# Patient Record
Sex: FEMALE | Race: ASIAN | NOT HISPANIC OR LATINO | Employment: FULL TIME | URBAN - METROPOLITAN AREA
[De-identification: names, ages, dates, MRNs, and addresses within clinical notes are randomized per-mention and may not be internally consistent; named-entity substitution may affect disease eponyms.]

---

## 2021-02-09 ENCOUNTER — AMBULATORY - HEALTHEAST (OUTPATIENT)
Dept: NURSING | Facility: CLINIC | Age: 21
End: 2021-02-09

## 2021-03-02 ENCOUNTER — AMBULATORY - HEALTHEAST (OUTPATIENT)
Dept: NURSING | Facility: CLINIC | Age: 21
End: 2021-03-02

## 2023-02-05 ENCOUNTER — HEALTH MAINTENANCE LETTER (OUTPATIENT)
Age: 23
End: 2023-02-05

## 2023-02-28 ENCOUNTER — OFFICE VISIT (OUTPATIENT)
Dept: INTERNAL MEDICINE | Facility: CLINIC | Age: 23
End: 2023-02-28
Payer: COMMERCIAL

## 2023-02-28 VITALS
OXYGEN SATURATION: 96 % | SYSTOLIC BLOOD PRESSURE: 116 MMHG | RESPIRATION RATE: 16 BRPM | HEIGHT: 62 IN | BODY MASS INDEX: 27.05 KG/M2 | WEIGHT: 147 LBS | HEART RATE: 112 BPM | TEMPERATURE: 97.5 F | DIASTOLIC BLOOD PRESSURE: 76 MMHG

## 2023-02-28 DIAGNOSIS — F33.42 RECURRENT MAJOR DEPRESSIVE DISORDER, IN FULL REMISSION (H): ICD-10-CM

## 2023-02-28 DIAGNOSIS — D22.30 CHANGE IN FACIAL MOLE: Primary | ICD-10-CM

## 2023-02-28 PROCEDURE — 99203 OFFICE O/P NEW LOW 30 MIN: CPT

## 2023-02-28 RX ORDER — ESCITALOPRAM OXALATE 20 MG/1
20 TABLET ORAL DAILY
Qty: 90 TABLET | Refills: 1 | Status: SHIPPED | OUTPATIENT
Start: 2023-02-28 | End: 2024-03-13

## 2023-02-28 RX ORDER — ESCITALOPRAM OXALATE 20 MG/1
1 TABLET ORAL DAILY
COMMUNITY
Start: 2022-02-22 | End: 2023-02-28

## 2023-02-28 NOTE — PROGRESS NOTES
"  Assessment & Plan     (D22.30) Change in facial mole  (primary encounter diagnosis)  Comment:   Pt has mole on face and back she is concerned about. At times they are itchy and she is worried the moles may be growing in size. Moles are about the width of a pencil eraser- about 3 mm in diameter. Will send to dermatology for complete evaluation but we discussed both lesions are likely benign lesions.  Plan: Adult Dermatology Referral          (F33.42) Recurrent major depressive disorder, in full remission (H)  Comment: Symptoms well controlled on current medication. Continue Lexapro 20MG.  Plan: escitalopram (LEXAPRO) 20 MG tablet                 BMI:   Estimated body mass index is 26.67 kg/m  as calculated from the following:    Height as of this encounter: 1.581 m (5' 2.25\").    Weight as of this encounter: 66.7 kg (147 lb).           No follow-ups on file.    Neelam Jolly, DAVION CNP  [facility]            Derrek Desouza is a 22 year old presenting for the following health issues:  Derm Problem (Mole )      History of Present Illness       Reason for visit:  2 moles and establish care  Symptom onset:  3-4 weeks ago  Symptoms include:  Changing mole  Symptom intensity:  Mild  Symptom progression:  Staying the same  Had these symptoms before:  No    She eats 2-3 servings of fruits and vegetables daily.She consumes 1 sweetened beverage(s) daily.She exercises with enough effort to increase her heart rate 9 or less minutes per day.  She exercises with enough effort to increase her heart rate 3 or less days per week. She is missing 2 dose(s) of medications per week.     Establish care   Check moles           Objective    /76   Pulse 112   Temp 97.5  F (36.4  C) (Oral)   Resp 16   Ht 1.581 m (5' 2.25\")   Wt 66.7 kg (147 lb)   LMP  (LMP Unknown)   SpO2 96%   BMI 26.67 kg/m    Body mass index is 26.67 kg/m .  Physical Exam  Constitutional:       General: She is not in acute " distress.     Appearance: Normal appearance. She is not ill-appearing, toxic-appearing or diaphoretic.   HENT:      Head: Normocephalic and atraumatic.   Eyes:      Conjunctiva/sclera: Conjunctivae normal.   Skin:     General: Skin is warm and dry.          Neurological:      Mental Status: She is alert and oriented to person, place, and time.   Psychiatric:         Mood and Affect: Mood normal.         Behavior: Behavior normal.         Thought Content: Thought content normal.         Judgment: Judgment normal.

## 2023-05-01 ENCOUNTER — OFFICE VISIT (OUTPATIENT)
Dept: URGENT CARE | Facility: URGENT CARE | Age: 23
End: 2023-05-01
Payer: COMMERCIAL

## 2023-05-01 VITALS
TEMPERATURE: 100.1 F | OXYGEN SATURATION: 98 % | SYSTOLIC BLOOD PRESSURE: 112 MMHG | RESPIRATION RATE: 16 BRPM | DIASTOLIC BLOOD PRESSURE: 72 MMHG | HEART RATE: 103 BPM

## 2023-05-01 DIAGNOSIS — J02.0 STREPTOCOCCAL SORE THROAT: ICD-10-CM

## 2023-05-01 DIAGNOSIS — R07.0 THROAT PAIN: Primary | ICD-10-CM

## 2023-05-01 DIAGNOSIS — R50.9 FEVER, UNSPECIFIED FEVER CAUSE: ICD-10-CM

## 2023-05-01 LAB — DEPRECATED S PYO AG THROAT QL EIA: POSITIVE

## 2023-05-01 PROCEDURE — 87880 STREP A ASSAY W/OPTIC: CPT | Performed by: PHYSICIAN ASSISTANT

## 2023-05-01 PROCEDURE — 99214 OFFICE O/P EST MOD 30 MIN: CPT | Performed by: PHYSICIAN ASSISTANT

## 2023-05-01 RX ORDER — BUPROPION HYDROCHLORIDE 150 MG/1
1 TABLET ORAL EVERY MORNING
COMMUNITY
Start: 2022-02-22 | End: 2024-01-05

## 2023-05-01 RX ORDER — AMOXICILLIN 875 MG
875 TABLET ORAL 2 TIMES DAILY
Qty: 20 TABLET | Refills: 0 | Status: SHIPPED | OUTPATIENT
Start: 2023-05-01 | End: 2023-05-11

## 2023-05-01 RX ORDER — IBUPROFEN 600 MG/1
600 TABLET, FILM COATED ORAL EVERY 6 HOURS PRN
Qty: 30 TABLET | Refills: 0
Start: 2023-05-01 | End: 2024-01-05

## 2023-05-01 NOTE — PROGRESS NOTES
Assessment & Plan     Throat pain    Motrin for sore throat  Salt water gargles    - Streptococcus A Rapid Screen w/Reflex to PCR  - ibuprofen (ADVIL/MOTRIN) 600 MG tablet; Take 1 tablet (600 mg) by mouth every 6 hours as needed    Streptococcal sore throat    You have had a positive test for strep throat. Strep throat is a contagious illness. It's spread by coughing, kissing, sharing glasses or eating utensils, or by touching others after touching your mouth or nose. Symptoms include throat pain that is worse with swallowing, aching all over, headache, swollen lymph nodes at the front of the neck, and red swollen tonsils sometimes with white patches and fever. It's treated with antibiotic medicine. This should help you start to feel better in 1 to 2 days.     - amoxicillin (AMOXIL) 875 MG tablet; Take 1 tablet (875 mg) by mouth 2 times daily for 10 days    Fever, unspecified fever cause    A fever is a normal reaction of your body to an illness. The temperature itself often isn t harmful. It actually helps your body fight infections. You don t need to treat a fever unless you feel very uncomfortable.   If the fever doesn t get better within 1 hour after you take acetaminophen, take ibuprofen. If this works, keep taking the ibuprofen every 6 to 8 hours.   If either medicine alone doesn t keep the fever down, you may switch off between the 2 medicines every 3 to 4 hours. For example, take ibuprofen. Wait 3 hours. Then take acetaminophen. Wait 3 hours. Take ibuprofen, and so on.    - ibuprofen (ADVIL/MOTRIN) 600 MG tablet; Take 1 tablet (600 mg) by mouth every 6 hours as needed      At today's visit with Lyly Ingram , we discussed results, diagnosis, medications and formulated a plan.  We also discussed red flags for immediate return to clinic/ER, as well as indications for follow up with PCP if not improved in 3 days. Patient understood and agreed to plan. Lyly Howelleder was discharged with stable vitals  and has no further questions.       No follow-ups on file.    Donnell Brower, [facility], PA-C  [facility] JERMAINE Desouza is a 22 year old, presenting for the following health issues:  Pharyngitis (Sore throat, white spots, and chills that started today )         View : No data to display.              HPI   Review of Systems   Constitutional, HEENT, cardiovascular, pulmonary, gi and gu systems are negative, except as otherwise noted.      Objective    /72   Pulse 103   Temp 100.1  F (37.8  C) (Tympanic)   Resp 16   SpO2 98%   There is no height or weight on file to calculate BMI.  Physical Exam   GENERAL: healthy, alert and no distress  EYES: Eyes grossly normal to inspection, PERRL and conjunctivae and sclerae normal  HENT: normal cephalic/atraumatic, ear canals and TM's normal, tonsillar hypertrophy and tonsillar erythema  NECK: cervical adenopathy anterior  RESP: lungs clear to auscultation - no rales, rhonchi or wheezes  CV: regular rate and rhythm, normal S1 S2, no S3 or S4, no murmur, click or rub, no peripheral edema and peripheral pulses strong  MS: no gross musculoskeletal defects noted, no edema  SKIN: no suspicious lesions or rashes  NEURO: Normal strength and tone, mentation intact and speech normal  PSYCH: mentation appears normal, affect normal/bright        Results for orders placed or performed in visit on 05/01/23   Streptococcus A Rapid Screen w/Reflex to PCR     Status: Abnormal    Specimen: Throat; Swab   Result Value Ref Range    Group A Strep antigen Positive (A) Negative

## 2023-07-21 ENCOUNTER — OFFICE VISIT (OUTPATIENT)
Dept: URGENT CARE | Facility: URGENT CARE | Age: 23
End: 2023-07-21
Payer: COMMERCIAL

## 2023-07-21 VITALS
HEART RATE: 109 BPM | BODY MASS INDEX: 28.12 KG/M2 | OXYGEN SATURATION: 96 % | WEIGHT: 155 LBS | RESPIRATION RATE: 18 BRPM | SYSTOLIC BLOOD PRESSURE: 122 MMHG | TEMPERATURE: 98.7 F | DIASTOLIC BLOOD PRESSURE: 83 MMHG

## 2023-07-21 DIAGNOSIS — N76.0 BV (BACTERIAL VAGINOSIS): ICD-10-CM

## 2023-07-21 DIAGNOSIS — B96.89 BV (BACTERIAL VAGINOSIS): ICD-10-CM

## 2023-07-21 DIAGNOSIS — R30.0 DYSURIA: Primary | ICD-10-CM

## 2023-07-21 LAB
ALBUMIN UR-MCNC: NEGATIVE MG/DL
APPEARANCE UR: CLEAR
BACTERIA #/AREA URNS HPF: ABNORMAL /HPF
BILIRUB UR QL STRIP: NEGATIVE
CLUE CELLS: PRESENT
COLOR UR AUTO: YELLOW
GLUCOSE UR STRIP-MCNC: NEGATIVE MG/DL
HGB UR QL STRIP: ABNORMAL
KETONES UR STRIP-MCNC: NEGATIVE MG/DL
LEUKOCYTE ESTERASE UR QL STRIP: ABNORMAL
NITRATE UR QL: NEGATIVE
PH UR STRIP: 5.5 [PH] (ref 5–7)
RBC #/AREA URNS AUTO: ABNORMAL /HPF
SP GR UR STRIP: >=1.03 (ref 1–1.03)
SQUAMOUS #/AREA URNS AUTO: ABNORMAL /LPF
TRICHOMONAS, WET PREP: ABNORMAL
UROBILINOGEN UR STRIP-ACNC: 0.2 E.U./DL
WBC #/AREA URNS AUTO: ABNORMAL /HPF
WBC'S/HIGH POWER FIELD, WET PREP: ABNORMAL
YEAST, WET PREP: ABNORMAL

## 2023-07-21 PROCEDURE — 87210 SMEAR WET MOUNT SALINE/INK: CPT | Performed by: PHYSICIAN ASSISTANT

## 2023-07-21 PROCEDURE — 81001 URINALYSIS AUTO W/SCOPE: CPT | Performed by: PHYSICIAN ASSISTANT

## 2023-07-21 PROCEDURE — 99213 OFFICE O/P EST LOW 20 MIN: CPT | Performed by: PHYSICIAN ASSISTANT

## 2023-07-21 PROCEDURE — 87491 CHLMYD TRACH DNA AMP PROBE: CPT | Performed by: PHYSICIAN ASSISTANT

## 2023-07-21 PROCEDURE — 87591 N.GONORRHOEAE DNA AMP PROB: CPT | Performed by: PHYSICIAN ASSISTANT

## 2023-07-21 RX ORDER — COPPER 313.4 MG/1
1 INTRAUTERINE DEVICE INTRAUTERINE ONCE
COMMUNITY
End: 2024-01-05

## 2023-07-21 RX ORDER — METRONIDAZOLE 500 MG/1
500 TABLET ORAL 2 TIMES DAILY
Qty: 14 TABLET | Refills: 0 | Status: SHIPPED | OUTPATIENT
Start: 2023-07-21 | End: 2023-07-28

## 2023-07-21 ASSESSMENT — ENCOUNTER SYMPTOMS
DYSURIA: 0
FEVER: 0
FLANK PAIN: 0

## 2023-07-21 NOTE — PROGRESS NOTES
SUBJECTIVE:   Lyly Ingram is a 23 year old female presenting with a chief complaint of   Chief Complaint   Patient presents with     Vaginal Problem     Vaginal discharge and dysuria       She is an established patient of [facility].   Patient presents with complaints of discharge and burning sensation and odor x 2-3 days.  Patient has had BV in the past.  She also has a new partner.  No known exposure to STDs.           Review of Systems   Constitutional: Negative for fever.   Genitourinary: Positive for vaginal discharge. Negative for dysuria and flank pain.   All other systems reviewed and are negative.      No past medical history on file.  No family history on file.  Current Outpatient Medications   Medication Sig Dispense Refill     escitalopram (LEXAPRO) 20 MG tablet Take 1 tablet (20 mg) by mouth daily 90 tablet 1     ibuprofen (ADVIL/MOTRIN) 600 MG tablet Take 1 tablet (600 mg) by mouth every 6 hours as needed 30 tablet 0     paragard intrauterine copper device 1 each by Intrauterine route once       buPROPion (WELLBUTRIN XL) 150 MG 24 hr tablet Take 1 tablet by mouth every morning       Social History     Tobacco Use     Smoking status: Never     Smokeless tobacco: Never   Substance Use Topics     Alcohol use: Yes     Alcohol/week: 2.0 standard drinks of alcohol     Types: 2 Cans of beer per week       OBJECTIVE  /83 (BP Location: Left arm, Patient Position: Sitting, Cuff Size: Adult Regular)   Pulse 109   Temp 98.7  F (37.1  C) (Oral)   Resp 18   Wt 70.3 kg (155 lb)   SpO2 96%   BMI 28.12 kg/m      Physical Exam  Vitals and nursing note reviewed.   Constitutional:       General: She is not in acute distress.     Appearance: Normal appearance. She is obese. She is not ill-appearing.   Eyes:      Extraocular Movements: Extraocular movements intact.      Conjunctiva/sclera: Conjunctivae normal.   Cardiovascular:      Rate and Rhythm: Normal rate.   Abdominal:      Tenderness: There is no  right CVA tenderness or left CVA tenderness.   Neurological:      General: No focal deficit present.      Mental Status: She is alert.   Psychiatric:         Mood and Affect: Mood normal.         Behavior: Behavior normal.         Labs:  Results for orders placed or performed in visit on 07/21/23 (from the past 24 hour(s))   UA with Microscopic reflex to Culture - Clinic Collect    Specimen: Urine, Midstream   Result Value Ref Range    Color Urine Yellow Colorless, Straw, Light Yellow, Yellow    Appearance Urine Clear Clear    Glucose Urine Negative Negative mg/dL    Bilirubin Urine Negative Negative    Ketones Urine Negative Negative mg/dL    Specific Gravity Urine >=1.030 1.003 - 1.035    Blood Urine Small (A) Negative    pH Urine 5.5 5.0 - 7.0    Protein Albumin Urine Negative Negative mg/dL    Urobilinogen Urine 0.2 0.2, 1.0 E.U./dL    Nitrite Urine Negative Negative    Leukocyte Esterase Urine Small (A) Negative   Wet prep - Clinic Collect    Specimen: Vagina; Swab   Result Value Ref Range    Trichomonas Absent Absent    Yeast Absent Absent    Clue Cells Present (A) Absent    WBCs/high power field 1+ (A) None   UA Microscopic with Reflex to Culture   Result Value Ref Range    Bacteria Urine None Seen None Seen /HPF    RBC Urine 0-2 0-2 /HPF /HPF    WBC Urine 0-5 0-5 /HPF /HPF    Squamous Epithelials Urine Few (A) None Seen /LPF    Narrative    Urine Culture not indicated       X-Ray was not done.    ASSESSMENT:      ICD-10-CM    1. Dysuria  R30.0 UA with Microscopic reflex to Culture - Clinic Collect     Wet prep - Clinic Collect     Neisseria gonorrhoeae PCR     Chlamydia trachomatis PCR     UA Microscopic with Reflex to Culture           Medical Decision Making:    Differential Diagnosis:  Gyn Problem: Vaginitis:  yeast, trichomonas vaginalis, bacterial vaginosis, STD exposure and Urinary tract infection    Serious Comorbid Conditions:  Adult:  reviewed    PLAN:    GC pending.  Will call with any abnormal  results.  Rx for flagyl.  Patient education.  No etoh while taking flagyl.      Followup:    If not improving or if condition worsens, follow up with your Primary Care Provider, If not improving or if conditions worsens over the next 12-24 hours, go to the Emergency Department    There are no Patient Instructions on file for this visit.

## 2023-07-22 LAB
C TRACH DNA SPEC QL NAA+PROBE: NEGATIVE
N GONORRHOEA DNA SPEC QL NAA+PROBE: NEGATIVE

## 2023-08-02 ENCOUNTER — E-VISIT (OUTPATIENT)
Dept: URGENT CARE | Facility: CLINIC | Age: 23
End: 2023-08-02
Payer: COMMERCIAL

## 2023-08-02 DIAGNOSIS — B96.89 BACTERIAL VAGINOSIS: Primary | ICD-10-CM

## 2023-08-02 DIAGNOSIS — N76.0 BACTERIAL VAGINOSIS: Primary | ICD-10-CM

## 2023-08-02 PROCEDURE — 99421 OL DIG E/M SVC 5-10 MIN: CPT | Performed by: NURSE PRACTITIONER

## 2023-08-02 RX ORDER — CLINDAMYCIN PHOSPHATE 20 MG/G
1 CREAM VAGINAL AT BEDTIME
Qty: 40 G | Refills: 0 | Status: SHIPPED | OUTPATIENT
Start: 2023-08-02 | End: 2023-08-05

## 2023-08-03 NOTE — PATIENT INSTRUCTIONS
Thank you for choosing us for your care. I have placed an order for a prescription so that you can start treatment. View your full visit summary for details by clicking on the link below. Your pharmacist will able to address any questions you may have about the medication.     If you re not feeling better within 2-3 days, please schedule an appointment.  You can schedule an appointment right here in [facility], or call [phone]  If the visit is for the same symptoms as your eVisit, we ll refund the cost of your eVisit if seen within seven days.

## 2023-10-25 ENCOUNTER — E-VISIT (OUTPATIENT)
Dept: URGENT CARE | Facility: CLINIC | Age: 23
End: 2023-10-25
Payer: COMMERCIAL

## 2023-10-25 DIAGNOSIS — N89.8 VAGINAL DISCHARGE: Primary | ICD-10-CM

## 2023-10-25 PROCEDURE — 99421 OL DIG E/M SVC 5-10 MIN: CPT | Performed by: NURSE PRACTITIONER

## 2023-10-26 NOTE — PATIENT INSTRUCTIONS
Thank you for choosing us for your care. Given your symptoms, I would like you to do a lab-only visit to determine what is causing them.  I have placed the orders.  Please schedule an appointment with the lab right here in [place], or call [phone].  I will let you know when the results are back and next steps to take.

## 2023-12-27 ENCOUNTER — E-VISIT (OUTPATIENT)
Dept: INTERNAL MEDICINE | Facility: CLINIC | Age: 23
End: 2023-12-27
Payer: COMMERCIAL

## 2023-12-27 DIAGNOSIS — R19.4 BOWEL HABIT CHANGES: Primary | ICD-10-CM

## 2023-12-27 PROCEDURE — 99207 PR NON-BILLABLE SERV PER CHARTING: CPT

## 2024-01-03 NOTE — PROGRESS NOTES
"Lyly is a 23 year old No obstetric history on file. female who presents for annual exam.     Besides routine health maintenance,  she would like to discuss swelling in hands and fingers.  HPI: Lyly is here for an annual exam. She reports that she was seen in the urgent care today due to abdominal discomfort. She had an xray and told that her colon is full. Given follow up instructions by that provider.   She has a history of abnormal pap smears and was recommended to have a colposcopy. We will repeat pap testing today.   Lyly also has some concerns about having \"brain fog\" after eating lunch. Agreeable to A1C to be drawn. Today on exam the left side of her thyroid feels slightly enlarged. Agreeable to doing a TSH today as well as a ultrasound of her thyroid. She also mentioned that she will have fairly significant swelling of her hands. We will do a CMP and CBC today for this.   Menses are occasional light spotting with Mirena IUD in place. Happy with this form of birth control and plans to keep it.   No LMP recorded. (Menstrual status: IUD)..   Using IUD for contraception.    She is not currently considering pregnancy.    REPRODUCTIVE/GYNECOLOGIC HISTORY:  Lyly is sexually active with male partner(s) and is currently in monogamous relationship.   STI testing offered?  Accepted  History of abnormal Pap smear:  Yes  SOCIAL HISTORY  Abuse: does not report having previously been physical or sexually abused.    Do you feel safe in your environment? YES       She  reports that she has never smoked. She has never used smokeless tobacco.      Last PHQ-9 score on record =       1/5/2024    10:50 AM   PHQ-9 SCORE   PHQ-9 Total Score 3     Last GAD7 score on record =       1/5/2024    10:50 AM   BALAJI-7 SCORE   Total Score 9         HEALTH MAINTENANCE:  Care Gaps  Close care gaps     Overdue          Never done ADVANCE CARE PLANNING (Every 5 Years)       Never done DEPRESSION ACTION PLAN (Once)       Never done HIV " SCREENING (Once)- Today       AUG 17   2016 YEARLY PREVENTIVE VISIT (Yearly)-Today   Last completed: Aug 17, 2015     Never done HEPATITIS C SCREENING (Once)       SEP 1   2023 INFLUENZA VACCINE (1)  Last completed: 2020     SEP 1   2023 COVID-19 Vaccine (3 - 2023-24 season)  Last completed: Mar 2, 2021        Due Soon          2024 PAP (Every 3 Years)   Last completed: 2021- Today        HEALTHY HABITS  Have you had an eye exam in the last two years? YES     Do you routinely see the dentist once or twice yearly? NO, encouraged       HISTORY:  OB History    Para Term  AB Living   1 1 1 0 0 1   SAB IAB Ectopic Multiple Live Births   0 0 0 0 1      # Outcome Date GA Lbr Graham/2nd Weight Sex Delivery Anes PTL Lv   1 Term 16 37w3d 19:00 / 01:15 3.11 kg (6 lb 13.7 oz) F Vag-Spont Other, EPI  TORRIE      Birth Comments: Maternal Blood Type: A posMaternal Diabetic Status: unable to complete testingMaternal GBS Status: NegativeMaternal Hepatitis Status: NegativeThe above information was copied from the mother's chart by SHAKILA Alonso RNC and verified by SHAKILA Mayfield RN.      Apgar1: 8  Apgar5: 9     Past Medical History:   Diagnosis Date    IUD (intrauterine device) in place 2021    Mirena     Past Surgical History:   Procedure Laterality Date    EXTRACTION(S) DENTAL       Family History   Adopted: Yes     Social History     Socioeconomic History    Marital status: Single   Tobacco Use    Smoking status: Never    Smokeless tobacco: Never   Vaping Use    Vaping Use: Never used   Substance and Sexual Activity    Alcohol use: Yes     Alcohol/week: 2.0 standard drinks of alcohol     Types: 2 Cans of beer per week    Sexual activity: Not Currently     Partners: Male       Current Outpatient Medications:     escitalopram (LEXAPRO) 20 MG tablet, Take 1 tablet (20 mg) by mouth daily, Disp: 90 tablet, Rfl: 1    levonorgestrel (MIRENA) 52 MG (20 mcg/day) IUD, 1 each by Intrauterine route, Disp: ,  "Rfl:    No Known Allergies    Past medical, surgical, social and family history were reviewed and updated in EPIC.    ROS:   12 point review of systems negative other than symptoms noted below or in the HPI.    PHYSICAL EXAM:  /74   Ht 1.575 m (5' 2\")   Wt 71.7 kg (158 lb)   BMI 28.90 kg/m     BMI: Body mass index is 28.9 kg/m .  Constitutional: healthy, alert and no distress  Neck: symmetrical, thyroid normal size, no masses present, no lymphadenopathy present.   Cardiovascular: RRR, no murmurs present  Respiratory: breathing unlabored, lungs CTA bilaterally  Breast:normal without masses, tenderness or nipple discharge and no palpable axillary masses or adenopathy  Gastrointestinal: abdomen soft, non-tender, bowel sounds present  PELVIC EXAM:  Vulva: No lesions, no adenopathy, BUS WNL  Vagina: Moist, pink, discharge normal  well rugated, no lesions. GC/Chlam collected  Cervix:smooth, pink, no visible lesions. Pap smear collected  Uterus: Normal size, non-tender, mobile  Ovaries: No masses palpated  Rectal exam: deferred    ASSESSMENT/PLAN:    ICD-10-CM    1. Well woman exam  Z01.419 CBC with platelets     Hemoglobin A1c     Basic metabolic panel  (Ca, Cl, CO2, Creat, Gluc, K, Na, BUN)     TSH with free T4 reflex     CBC with platelets     Hemoglobin A1c     Basic metabolic panel  (Ca, Cl, CO2, Creat, Gluc, K, Na, BUN)     TSH with free T4 reflex      2. Routine screening for STI (sexually transmitted infection)  Z11.3 HIV Antigen Antibody Combo     Treponema Abs w Reflex to RPR and Titer     NEISSERIA GONORRHOEA PCR     CHLAMYDIA TRACHOMATIS PCR     HIV Antigen Antibody Combo     Treponema Abs w Reflex to RPR and Titer      3. Thyroid enlargement  E04.9 US Thyroid      4. Brain fog  R41.89       5. Swelling of both hands  M79.89       6. Constipation, unspecified constipation type  K59.00         Results for orders placed or performed in visit on 01/05/24   CBC with platelets     Status: Normal   Result " Value Ref Range    WBC Count 7.1 4.0 - 11.0 10e3/uL    RBC Count 4.89 3.80 - 5.20 10e6/uL    Hemoglobin 14.9 11.7 - 15.7 g/dL    Hematocrit 43.4 35.0 - 47.0 %    MCV 89 78 - 100 fL    MCH 30.5 26.5 - 33.0 pg    MCHC 34.3 31.5 - 36.5 g/dL    RDW 11.2 10.0 - 15.0 %    Platelet Count 285 150 - 450 10e3/uL   Hemoglobin A1c     Status: Normal   Result Value Ref Range    Hemoglobin A1C 5.3 0.0 - 5.6 %         COUNSELING:   Reviewed preventive health counseling, as reflected in patient instructions       Regular exercise       Vision/dental screening       Immunizations  Declined: Covid-19 and Influenza due to Other declined             Contraception  Follow up for any concerns will be based on lab and imaging results.     Priti White CNM

## 2024-01-05 ENCOUNTER — HOSPITAL ENCOUNTER (EMERGENCY)
Facility: CLINIC | Age: 24
Discharge: HOME | End: 2024-01-05
Attending: EMERGENCY MEDICINE | Admitting: EMERGENCY MEDICINE
Payer: COMMERCIAL

## 2024-01-05 ENCOUNTER — OFFICE VISIT (OUTPATIENT)
Dept: MIDWIFE SERVICES | Facility: CLINIC | Age: 24
End: 2024-01-05
Payer: COMMERCIAL

## 2024-01-05 VITALS
WEIGHT: 158 LBS | BODY MASS INDEX: 29.08 KG/M2 | SYSTOLIC BLOOD PRESSURE: 110 MMHG | DIASTOLIC BLOOD PRESSURE: 74 MMHG | HEIGHT: 62 IN

## 2024-01-05 VITALS
HEART RATE: 80 BPM | TEMPERATURE: 98.4 F | RESPIRATION RATE: 18 BRPM | SYSTOLIC BLOOD PRESSURE: 123 MMHG | DIASTOLIC BLOOD PRESSURE: 77 MMHG | OXYGEN SATURATION: 98 %

## 2024-01-05 DIAGNOSIS — Z12.4 CERVICAL CANCER SCREENING: ICD-10-CM

## 2024-01-05 DIAGNOSIS — K59.00 CONSTIPATION, UNSPECIFIED CONSTIPATION TYPE: ICD-10-CM

## 2024-01-05 DIAGNOSIS — R41.89 BRAIN FOG: ICD-10-CM

## 2024-01-05 DIAGNOSIS — Z11.3 ROUTINE SCREENING FOR STI (SEXUALLY TRANSMITTED INFECTION): ICD-10-CM

## 2024-01-05 DIAGNOSIS — Z01.419 WELL WOMAN EXAM: Primary | ICD-10-CM

## 2024-01-05 DIAGNOSIS — E04.9 THYROID ENLARGEMENT: ICD-10-CM

## 2024-01-05 DIAGNOSIS — M79.89 SWELLING OF BOTH HANDS: ICD-10-CM

## 2024-01-05 LAB
ALBUMIN SERPL BCG-MCNC: 4.7 G/DL (ref 3.5–5.2)
ALBUMIN UR-MCNC: 10 MG/DL
ALP SERPL-CCNC: 58 U/L (ref 40–150)
ALT SERPL W P-5'-P-CCNC: 20 U/L (ref 0–50)
ANION GAP SERPL CALCULATED.3IONS-SCNC: 11 MMOL/L (ref 7–15)
ANION GAP SERPL CALCULATED.3IONS-SCNC: 9 MMOL/L (ref 7–15)
APPEARANCE UR: ABNORMAL
AST SERPL W P-5'-P-CCNC: 21 U/L (ref 0–45)
BASOPHILS # BLD AUTO: 0.1 10E3/UL (ref 0–0.2)
BASOPHILS NFR BLD AUTO: 1 %
BILIRUB SERPL-MCNC: 0.2 MG/DL
BILIRUB UR QL STRIP: NEGATIVE
BUN SERPL-MCNC: 14.6 MG/DL (ref 6–20)
BUN SERPL-MCNC: 9 MG/DL (ref 6–20)
CALCIUM SERPL-MCNC: 9 MG/DL (ref 8.6–10)
CALCIUM SERPL-MCNC: 9.7 MG/DL (ref 8.6–10)
CHLORIDE SERPL-SCNC: 102 MMOL/L (ref 98–107)
CHLORIDE SERPL-SCNC: 99 MMOL/L (ref 98–107)
COLOR UR AUTO: YELLOW
CREAT SERPL-MCNC: 0.6 MG/DL (ref 0.51–0.95)
CREAT SERPL-MCNC: 0.64 MG/DL (ref 0.51–0.95)
DEPRECATED HCO3 PLAS-SCNC: 27 MMOL/L (ref 22–29)
DEPRECATED HCO3 PLAS-SCNC: 29 MMOL/L (ref 22–29)
EGFRCR SERPLBLD CKD-EPI 2021: >90 ML/MIN/1.73M2
EGFRCR SERPLBLD CKD-EPI 2021: >90 ML/MIN/1.73M2
EOSINOPHIL # BLD AUTO: 0.2 10E3/UL (ref 0–0.7)
EOSINOPHIL NFR BLD AUTO: 2 %
ERYTHROCYTE [DISTWIDTH] IN BLOOD BY AUTOMATED COUNT: 11.2 % (ref 10–15)
ERYTHROCYTE [DISTWIDTH] IN BLOOD BY AUTOMATED COUNT: 11.3 % (ref 10–15)
GLUCOSE SERPL-MCNC: 93 MG/DL (ref 70–99)
GLUCOSE SERPL-MCNC: 98 MG/DL (ref 70–99)
GLUCOSE UR STRIP-MCNC: NEGATIVE MG/DL
HBA1C MFR BLD: 5.3 % (ref 0–5.6)
HCG UR QL: NEGATIVE
HCT VFR BLD AUTO: 43 % (ref 35–47)
HCT VFR BLD AUTO: 43.4 % (ref 35–47)
HGB BLD-MCNC: 14.5 G/DL (ref 11.7–15.7)
HGB BLD-MCNC: 14.9 G/DL (ref 11.7–15.7)
HGB UR QL STRIP: ABNORMAL
HIV 1+2 AB+HIV1 P24 AG SERPL QL IA: NONREACTIVE
IMM GRANULOCYTES # BLD: 0 10E3/UL
IMM GRANULOCYTES NFR BLD: 0 %
KETONES UR STRIP-MCNC: NEGATIVE MG/DL
LEUKOCYTE ESTERASE UR QL STRIP: NEGATIVE
LIPASE SERPL-CCNC: 37 U/L (ref 13–60)
LYMPHOCYTES # BLD AUTO: 2.4 10E3/UL (ref 0.8–5.3)
LYMPHOCYTES NFR BLD AUTO: 34 %
MCH RBC QN AUTO: 30 PG (ref 26.5–33)
MCH RBC QN AUTO: 30.5 PG (ref 26.5–33)
MCHC RBC AUTO-ENTMCNC: 33.7 G/DL (ref 31.5–36.5)
MCHC RBC AUTO-ENTMCNC: 34.3 G/DL (ref 31.5–36.5)
MCV RBC AUTO: 89 FL (ref 78–100)
MCV RBC AUTO: 89 FL (ref 78–100)
MONOCYTES # BLD AUTO: 0.5 10E3/UL (ref 0–1.3)
MONOCYTES NFR BLD AUTO: 7 %
MUCOUS THREADS #/AREA URNS LPF: PRESENT /LPF
NEUTROPHILS # BLD AUTO: 3.8 10E3/UL (ref 1.6–8.3)
NEUTROPHILS NFR BLD AUTO: 56 %
NITRATE UR QL: NEGATIVE
NRBC # BLD AUTO: 0 10E3/UL
NRBC BLD AUTO-RTO: 0 /100
PH UR STRIP: 7.5 [PH] (ref 5–7)
PLATELET # BLD AUTO: 285 10E3/UL (ref 150–450)
PLATELET # BLD AUTO: 289 10E3/UL (ref 150–450)
POTASSIUM SERPL-SCNC: 4.1 MMOL/L (ref 3.4–5.3)
POTASSIUM SERPL-SCNC: 4.3 MMOL/L (ref 3.4–5.3)
PROT SERPL-MCNC: 8 G/DL (ref 6.4–8.3)
RBC # BLD AUTO: 4.83 10E6/UL (ref 3.8–5.2)
RBC # BLD AUTO: 4.89 10E6/UL (ref 3.8–5.2)
RBC URINE: 2 /HPF
SODIUM SERPL-SCNC: 137 MMOL/L (ref 135–145)
SODIUM SERPL-SCNC: 140 MMOL/L (ref 135–145)
SP GR UR STRIP: 1.02 (ref 1–1.03)
SQUAMOUS EPITHELIAL: 62 /HPF
TSH SERPL DL<=0.005 MIU/L-ACNC: 1.69 UIU/ML (ref 0.3–4.2)
UROBILINOGEN UR STRIP-MCNC: NORMAL MG/DL
WBC # BLD AUTO: 6.9 10E3/UL (ref 4–11)
WBC # BLD AUTO: 7.1 10E3/UL (ref 4–11)
WBC URINE: 3 /HPF

## 2024-01-05 PROCEDURE — 84443 ASSAY THYROID STIM HORMONE: CPT | Performed by: ADVANCED PRACTICE MIDWIFE

## 2024-01-05 PROCEDURE — 88175 CYTOPATH C/V AUTO FLUID REDO: CPT | Performed by: ADVANCED PRACTICE MIDWIFE

## 2024-01-05 PROCEDURE — 87491 CHLMYD TRACH DNA AMP PROBE: CPT | Performed by: ADVANCED PRACTICE MIDWIFE

## 2024-01-05 PROCEDURE — 85025 COMPLETE CBC W/AUTO DIFF WBC: CPT | Performed by: ADVANCED PRACTICE MIDWIFE

## 2024-01-05 PROCEDURE — 81001 URINALYSIS AUTO W/SCOPE: CPT | Performed by: EMERGENCY MEDICINE

## 2024-01-05 PROCEDURE — 80053 COMPREHEN METABOLIC PANEL: CPT | Performed by: EMERGENCY MEDICINE

## 2024-01-05 PROCEDURE — 80048 BASIC METABOLIC PNL TOTAL CA: CPT | Performed by: ADVANCED PRACTICE MIDWIFE

## 2024-01-05 PROCEDURE — 87389 HIV-1 AG W/HIV-1&-2 AB AG IA: CPT | Performed by: ADVANCED PRACTICE MIDWIFE

## 2024-01-05 PROCEDURE — 99212 OFFICE O/P EST SF 10 MIN: CPT | Mod: 25 | Performed by: ADVANCED PRACTICE MIDWIFE

## 2024-01-05 PROCEDURE — 87624 HPV HI-RISK TYP POOLED RSLT: CPT | Performed by: ADVANCED PRACTICE MIDWIFE

## 2024-01-05 PROCEDURE — 85027 COMPLETE CBC AUTOMATED: CPT | Performed by: EMERGENCY MEDICINE

## 2024-01-05 PROCEDURE — 83690 ASSAY OF LIPASE: CPT | Performed by: EMERGENCY MEDICINE

## 2024-01-05 PROCEDURE — 87591 N.GONORRHOEAE DNA AMP PROB: CPT | Performed by: ADVANCED PRACTICE MIDWIFE

## 2024-01-05 PROCEDURE — 86780 TREPONEMA PALLIDUM: CPT | Performed by: ADVANCED PRACTICE MIDWIFE

## 2024-01-05 PROCEDURE — 99283 EMERGENCY DEPT VISIT LOW MDM: CPT

## 2024-01-05 PROCEDURE — 99385 PREV VISIT NEW AGE 18-39: CPT | Performed by: ADVANCED PRACTICE MIDWIFE

## 2024-01-05 PROCEDURE — 83036 HEMOGLOBIN GLYCOSYLATED A1C: CPT | Performed by: ADVANCED PRACTICE MIDWIFE

## 2024-01-05 PROCEDURE — 36415 COLL VENOUS BLD VENIPUNCTURE: CPT | Performed by: EMERGENCY MEDICINE

## 2024-01-05 PROCEDURE — 81025 URINE PREGNANCY TEST: CPT | Performed by: EMERGENCY MEDICINE

## 2024-01-05 PROCEDURE — 36415 COLL VENOUS BLD VENIPUNCTURE: CPT | Performed by: ADVANCED PRACTICE MIDWIFE

## 2024-01-05 ASSESSMENT — PATIENT HEALTH QUESTIONNAIRE - PHQ9
SUM OF ALL RESPONSES TO PHQ QUESTIONS 1-9: 3
5. POOR APPETITE OR OVEREATING: MORE THAN HALF THE DAYS

## 2024-01-05 ASSESSMENT — ANXIETY QUESTIONNAIRES
IF YOU CHECKED OFF ANY PROBLEMS ON THIS QUESTIONNAIRE, HOW DIFFICULT HAVE THESE PROBLEMS MADE IT FOR YOU TO DO YOUR WORK, TAKE CARE OF THINGS AT HOME, OR GET ALONG WITH OTHER PEOPLE: SOMEWHAT DIFFICULT
GAD7 TOTAL SCORE: 9
3. WORRYING TOO MUCH ABOUT DIFFERENT THINGS: MORE THAN HALF THE DAYS
5. BEING SO RESTLESS THAT IT IS HARD TO SIT STILL: NOT AT ALL
GAD7 TOTAL SCORE: 9
6. BECOMING EASILY ANNOYED OR IRRITABLE: SEVERAL DAYS
2. NOT BEING ABLE TO STOP OR CONTROL WORRYING: MORE THAN HALF THE DAYS
1. FEELING NERVOUS, ANXIOUS, OR ON EDGE: SEVERAL DAYS
7. FEELING AFRAID AS IF SOMETHING AWFUL MIGHT HAPPEN: SEVERAL DAYS

## 2024-01-05 ASSESSMENT — ACTIVITIES OF DAILY LIVING (ADL): ADLS_ACUITY_SCORE: 35

## 2024-01-05 NOTE — NURSING NOTE
"Chief Complaint   Patient presents with    Physical       Initial /74   Ht 1.575 m (5' 2\")   Wt 71.7 kg (158 lb)   BMI 28.90 kg/m   Estimated body mass index is 28.9 kg/m  as calculated from the following:    Height as of this encounter: 1.575 m (5' 2\").    Weight as of this encounter: 71.7 kg (158 lb).  BP completed using cuff size: regular    Questioned patient about current smoking habits.  Pt. has never smoked.      No obstetric history on file.    The following HM Due: pap smear  Tiffanie (13-24)    Discuss swelling, especially in hands and fingers  Bhavana Whittington CMA on 1/5/2024 at 10:25 AM    "

## 2024-01-06 LAB
C TRACH DNA SPEC QL NAA+PROBE: NEGATIVE
N GONORRHOEA DNA SPEC QL NAA+PROBE: NEGATIVE
T PALLIDUM AB SER QL: NONREACTIVE

## 2024-01-06 NOTE — ED PROVIDER NOTES
"    History     Chief Complaint:  Constipation       HPI   Lyly Ingram is a 23 year old female who presents with \"burning\" upper abdominal pain that began two days ago. The patient was seen at Urgent Care earlier today. She did take Miralax as well as taking some magnesium which has provided little to no relief. The patient is lightheaded and dizzy. The patient last had a bowel movement around an hour and a half ago. Before that it was 5 days ago. Her bowel movements have been abnormally small for the past month. The patient is still experiencing the \"burning\" upper abdominal pain in the room. She has experienced on and off constipation in the past. She denies vomiting or the medicine she received at Urgent Care provided any relief.    Of note, the patient had an annual physical done as well today with some blood work done at [place] earlier today as well.    Independent Historian:    None     Review of External Notes:  I reviewed the patient's Urgent Care note from earlier today as well as the abdominal Xray.    I subsequently reviewed the patient's CBC, hemoglobin, and pap smear results from her physical earlier today.    Medications:    Lexapro     Past Medical History:    Suicide attempt  Depression  PTSD  Borderline personality disorder    Past Surgical History:    Dental extractions     Physical Exam   Patient Vitals for the past 24 hrs:   BP Temp Temp src Pulse Resp SpO2   01/05/24 1847 (!) 143/87 98.4  F (36.9  C) Oral 89 18 99 %      Physical Exam  Constitutional: Patient is well appearing. No distress.  Head: Atraumatic.  Eyes: Conjunctivae and EOM are normal. No scleral icterus.  Neck: Normal range of motion. Neck supple.   Cardiovascular: Normal rate, regular rhythm, normal heart sounds and intact distal perfusion.   Pulmonary/Chest: Breath sounds normal. No respiratory distress.  Abdominal: Soft. Bowel sounds are normal. No distension. No tenderness. No rebound or guarding.   Musculoskeletal: " Normal range of motion. No edema or tenderness.   Neurological: Alert and orientated to person, place, and time. No observable focal neuro deficit  Skin: Warm and dry. No rash noted. Not diaphoretic.      Emergency Department Course     Laboratory:  Labs Ordered and Resulted from Time of ED Arrival to Time of ED Departure   ROUTINE UA WITH MICROSCOPIC REFLEX TO CULTURE - Abnormal       Result Value    Color Urine Yellow      Appearance Urine Slightly Cloudy (*)     Glucose Urine Negative      Bilirubin Urine Negative      Ketones Urine Negative      Specific Gravity Urine 1.022      Blood Urine Trace (*)     pH Urine 7.5 (*)     Protein Albumin Urine 10 (*)     Urobilinogen Urine Normal      Nitrite Urine Negative      Leukocyte Esterase Urine Negative      Mucus Urine Present (*)     RBC Urine 2      WBC Urine 3      Squamous Epithelials Urine 62 (*)    COMPREHENSIVE METABOLIC PANEL - Normal    Sodium 140      Potassium 4.3      Carbon Dioxide (CO2) 29      Anion Gap 9      Urea Nitrogen 9.0      Creatinine 0.60      GFR Estimate >90      Calcium 9.0      Chloride 102      Glucose 98      Alkaline Phosphatase 58      AST 21      ALT 20      Protein Total 8.0      Albumin 4.7      Bilirubin Total 0.2     LIPASE - Normal    Lipase 37     HCG QUALITATIVE URINE - Normal    hCG Urine Qualitative Negative     CBC WITH PLATELETS AND DIFFERENTIAL    WBC Count 6.9      RBC Count 4.83      Hemoglobin 14.5      Hematocrit 43.0      MCV 89      MCH 30.0      MCHC 33.7      RDW 11.3      Platelet Count 289      % Neutrophils 56      % Lymphocytes 34      % Monocytes 7      % Eosinophils 2      % Basophils 1      % Immature Granulocytes 0      NRBCs per 100 WBC 0      Absolute Neutrophils 3.8      Absolute Lymphocytes 2.4      Absolute Monocytes 0.5      Absolute Eosinophils 0.2      Absolute Basophils 0.1      Absolute Immature Granulocytes 0.0      Absolute NRBCs 0.0        Emergency Department Course & Assessments:        Interventions:  Medications - No data to display     Assessments:  2008 I obtained history and examined the patient as noted above.   2214 I deemed the patient safe to discharge home.    Independent Interpretation (X-rays, CTs, rhythm strip):      Consultations/Discussion of Management or Tests:         Social Determinants of Health affecting care:  Stress and adjustment disorders     Disposition:  The patient was discharged to home.     Impression & Plan    Medical Decision Making:  Lyly Ingram is a 23 year old female who presents for evaluation for decreased stool output without signs of serious intraabdominal problems. Signs and symptoms are consistent with constipation.   There are no signs of obstruction nor other intraabdominal catastrophe.   Already had abdominal Xr that I reviewed report from .  We added additional blood work here with no abnl detected.  She has no red flags and considered broad ddx but no identifiable emergency.   I am going to send them home with instructions on medication management of this. They will then start daily stool softener as well once they are more completely cleaned out. Patient is agreeable with this and questions are answered.      Diagnosis:    ICD-10-CM    1. Constipation, unspecified constipation type  K59.00          Discharge Medications:  New Prescriptions    No medications on file      Scribe Disclosure:  I, Renaldo Sharma, am serving as a scribe at 8:09 PM on 1/5/2024 to document services personally performed by Renny Gardner MD based on my observations and the provider's statements to me.               Renny Gardner MD  01/06/24 0033

## 2024-01-06 NOTE — ED TRIAGE NOTES
Patient states she was at urgent care and was told she was constipated and was told to take Miralax and Mag Citrate.  Patient finished those at 4pm and was told if she did not have a BM to come to the ED.      Triage Assessment (Adult)       Row Name 01/05/24 6336          Triage Assessment    Airway WDL WDL        Respiratory WDL    Respiratory WDL WDL        Skin Circulation/Temperature WDL    Skin Circulation/Temperature WDL WDL        Cardiac WDL    Cardiac WDL WDL        Peripheral/Neurovascular WDL    Peripheral Neurovascular WDL WDL        Cognitive/Neuro/Behavioral WDL    Cognitive/Neuro/Behavioral WDL WDL

## 2024-01-10 LAB
BKR LAB AP GYN ADEQUACY: NORMAL
BKR LAB AP GYN INTERPRETATION: NORMAL
BKR LAB AP HPV REFLEX: NORMAL
BKR LAB AP PREVIOUS ABNL DX: NORMAL
BKR LAB AP PREVIOUS ABNORMAL: NORMAL
HUMAN PAPILLOMA VIRUS 16 DNA: NEGATIVE
HUMAN PAPILLOMA VIRUS 18 DNA: NEGATIVE
HUMAN PAPILLOMA VIRUS FINAL DIAGNOSIS: ABNORMAL
HUMAN PAPILLOMA VIRUS OTHER HR: POSITIVE
PATH REPORT.COMMENTS IMP SPEC: NORMAL
PATH REPORT.COMMENTS IMP SPEC: NORMAL
PATH REPORT.RELEVANT HX SPEC: NORMAL

## 2024-01-11 PROBLEM — R87.810 CERVICAL HIGH RISK HPV (HUMAN PAPILLOMAVIRUS) TEST POSITIVE: Status: ACTIVE | Noted: 2024-01-05

## 2024-01-17 ENCOUNTER — PATIENT OUTREACH (OUTPATIENT)
Dept: MIDWIFE SERVICES | Facility: CLINIC | Age: 24
End: 2024-01-17
Payer: COMMERCIAL

## 2024-01-17 NOTE — TELEPHONE ENCOUNTER
1/5/24 NIL pap, + HR HPV (not 16 or 18) at age 23. Plan cotest in 1 year due by 1/5/25 per provider

## 2024-01-26 ENCOUNTER — HOSPITAL ENCOUNTER (OUTPATIENT)
Dept: ULTRASOUND IMAGING | Facility: CLINIC | Age: 24
Discharge: HOME | End: 2024-01-26
Attending: ADVANCED PRACTICE MIDWIFE | Admitting: ADVANCED PRACTICE MIDWIFE
Payer: COMMERCIAL

## 2024-01-26 PROCEDURE — 76536 US EXAM OF HEAD AND NECK: CPT

## 2024-03-11 ENCOUNTER — TELEPHONE (OUTPATIENT)
Dept: MIDWIFE SERVICES | Facility: CLINIC | Age: 24
End: 2024-03-11
Payer: COMMERCIAL

## 2024-03-11 NOTE — TELEPHONE ENCOUNTER
[facility] Call Center    Phone Message    May a detailed message be left on voicemail: yes     Reason for Call: Medication Refill Request    Has the patient contacted the pharmacy for the refill? Yes   Name of medication being requested: escitalopram (LEXAPRO) 20 MG tablet  Provider who prescribed the medication: Priti White CNM  Pharmacy:   [id number] [place] [address] [address] [place]   Date medication is needed: as soon as possible      Action Taken: Other: [facility]    Travel Screening: Not Applicable

## 2024-03-11 NOTE — TELEPHONE ENCOUNTER
Returned call, prescribed by an alternate provider.  PCP advising patient needs virtual visit to refill, reinforced to patient.  Patient verbalized understanding.    Adela HUTSON RN

## 2024-03-13 DIAGNOSIS — F33.42 RECURRENT MAJOR DEPRESSIVE DISORDER, IN FULL REMISSION (H): ICD-10-CM

## 2024-03-13 RX ORDER — ESCITALOPRAM OXALATE 20 MG/1
20 TABLET ORAL DAILY
Qty: 90 TABLET | Refills: 0 | Status: SHIPPED | OUTPATIENT
Start: 2024-03-13 | End: 2024-03-22

## 2024-03-22 ENCOUNTER — VIRTUAL VISIT (OUTPATIENT)
Dept: INTERNAL MEDICINE | Facility: CLINIC | Age: 24
End: 2024-03-22
Payer: COMMERCIAL

## 2024-03-22 DIAGNOSIS — F41.1 GAD (GENERALIZED ANXIETY DISORDER): ICD-10-CM

## 2024-03-22 DIAGNOSIS — F33.42 RECURRENT MAJOR DEPRESSIVE DISORDER, IN FULL REMISSION (H): Primary | ICD-10-CM

## 2024-03-22 PROBLEM — F60.3 BORDERLINE PERSONALITY DISORDER (H): Status: ACTIVE | Noted: 2024-03-22

## 2024-03-22 PROBLEM — F60.3 BORDERLINE PERSONALITY DISORDER (H): Status: RESOLVED | Noted: 2024-03-22 | Resolved: 2024-03-22

## 2024-03-22 PROCEDURE — 99214 OFFICE O/P EST MOD 30 MIN: CPT | Mod: 95

## 2024-03-22 RX ORDER — ESCITALOPRAM OXALATE 20 MG/1
20 TABLET ORAL DAILY
Qty: 90 TABLET | Refills: 3 | Status: SHIPPED | OUTPATIENT
Start: 2024-03-22 | End: 2024-08-23

## 2024-03-22 ASSESSMENT — ANXIETY QUESTIONNAIRES
GAD7 TOTAL SCORE: 6
2. NOT BEING ABLE TO STOP OR CONTROL WORRYING: SEVERAL DAYS
7. FEELING AFRAID AS IF SOMETHING AWFUL MIGHT HAPPEN: SEVERAL DAYS
4. TROUBLE RELAXING: SEVERAL DAYS
1. FEELING NERVOUS, ANXIOUS, OR ON EDGE: SEVERAL DAYS
GAD7 TOTAL SCORE: 6
3. WORRYING TOO MUCH ABOUT DIFFERENT THINGS: SEVERAL DAYS
IF YOU CHECKED OFF ANY PROBLEMS ON THIS QUESTIONNAIRE, HOW DIFFICULT HAVE THESE PROBLEMS MADE IT FOR YOU TO DO YOUR WORK, TAKE CARE OF THINGS AT HOME, OR GET ALONG WITH OTHER PEOPLE: SOMEWHAT DIFFICULT
7. FEELING AFRAID AS IF SOMETHING AWFUL MIGHT HAPPEN: SEVERAL DAYS
8. IF YOU CHECKED OFF ANY PROBLEMS, HOW DIFFICULT HAVE THESE MADE IT FOR YOU TO DO YOUR WORK, TAKE CARE OF THINGS AT HOME, OR GET ALONG WITH OTHER PEOPLE?: SOMEWHAT DIFFICULT
5. BEING SO RESTLESS THAT IT IS HARD TO SIT STILL: SEVERAL DAYS
GAD7 TOTAL SCORE: 6
6. BECOMING EASILY ANNOYED OR IRRITABLE: NOT AT ALL

## 2024-03-22 ASSESSMENT — PATIENT HEALTH QUESTIONNAIRE - PHQ9
10. IF YOU CHECKED OFF ANY PROBLEMS, HOW DIFFICULT HAVE THESE PROBLEMS MADE IT FOR YOU TO DO YOUR WORK, TAKE CARE OF THINGS AT HOME, OR GET ALONG WITH OTHER PEOPLE: SOMEWHAT DIFFICULT
SUM OF ALL RESPONSES TO PHQ QUESTIONS 1-9: 3
SUM OF ALL RESPONSES TO PHQ QUESTIONS 1-9: 3

## 2024-03-22 NOTE — PROGRESS NOTES
"Lyly is a 23 year old who is being evaluated via a billable video visit.    How would you like to obtain your AVS? MyChart  If the video visit is dropped, the invitation should be resent by: Text to cell phone: [phone]  Will anyone else be joining your video visit? No      Assessment & Plan     (F33.42) Recurrent major depressive disorder, in full remission (H24)  (primary encounter diagnosis)  (F41.1) BALAJI (generalized anxiety disorder)  Comment: Depression and BALAJI well controlled on Lexapro 20MG  Plan: escitalopram (LEXAPRO) 20 MG tablet              BMI  Estimated body mass index is 28.9 kg/m  as calculated from the following:    Height as of 1/5/24: 1.575 m (5' 2\").    Weight as of 1/5/24: 71.7 kg (158 lb).   Weight management plan: Discussed healthy diet and exercise guidelines          Subjective   Lyly is a 23 year old, presenting for the following health issues:  Depression         No data to display              History of Present Illness       Mental Health Follow-up:  Patient presents to follow-up on Anxiety.    Patient's anxiety since last visit has been:  No change  The patient is not having other symptoms associated with anxiety.  Any significant life events: grief or loss  Patient is not feeling anxious or having panic attacks.  Patient has no concerns about alcohol or drug use.    She eats 2-3 servings of fruits and vegetables daily.She consumes 1 sweetened beverage(s) daily.She exercises with enough effort to increase her heart rate 10 to 19 minutes per day.  She exercises with enough effort to increase her heart rate 3 or less days per week. She is missing 2 dose(s) of medications per week.  She is not taking prescribed medications regularly due to remembering to take.   PATIENT HEALTH QUESTIONNAIRE-9 (PHQ - 9)    Over the last 2 weeks, how often have you been bothered by any of the following problems?    1. Little interest or pleasure in doing things -  Not at all   2. Feeling down, " depressed, or hopeless -  Not at all   3. Trouble falling or staying asleep, or sleeping too much - Several days   4. Feeling tired or having little energy -  Several days   5. Poor appetite or overeating -  Not at all   6. Feeling bad about yourself - or that you are a failure or have let yourself or your family down -  Not at all   7. Trouble concentrating on things, such as reading the newspaper or watching television - Several days   8. Moving or speaking so slowly that other people could have noticed? Or the opposite - being so fidgety or restless that you have been moving around a lot more than usual Not at all   9. Thoughts that you would be better off dead or of hurting  yourself in some way Not at all   Total Score: 3     If you checked off any problems, how difficult have these problems made it for you to do your work, take care of things at home, or get along with other people?      Developed by Yeyo Avila, Shannon Valdez, Artur Woods and colleagues, with an educational fanta from [employer] No permission required to reproduce, translate, display or distribute. permission required to reproduce, translate, display or distribute.      Sohan 7 = 6 somewhat difficult                 Objective           Vitals:  No vitals were obtained today due to virtual visit.    Physical Exam   GENERAL: alert and no distress  EYES: Eyes grossly normal to inspection.  No discharge or erythema, or obvious scleral/conjunctival abnormalities.  RESP: No audible wheeze, cough, or visible cyanosis.    SKIN: Visible skin clear. No significant rash, abnormal pigmentation or lesions.  NEURO: Cranial nerves grossly intact.  Mentation and speech appropriate for age.  PSYCH: Appropriate affect, tone, and pace of words          Video-Visit Details    Type of service:  Video Visit   Originating Location (pt. Location): Home    Distant Location (provider location):  On-site  Platform used for Video Visit: Gabi Seo  Electronically by: DAVION Armando CNP

## 2024-03-26 ENCOUNTER — E-VISIT (OUTPATIENT)
Dept: INTERNAL MEDICINE | Facility: CLINIC | Age: 24
End: 2024-03-26
Payer: COMMERCIAL

## 2024-03-26 DIAGNOSIS — Z11.3 SCREEN FOR STD (SEXUALLY TRANSMITTED DISEASE): Primary | ICD-10-CM

## 2024-03-26 PROCEDURE — 99422 OL DIG E/M SVC 11-20 MIN: CPT

## 2024-03-27 NOTE — PATIENT INSTRUCTIONS
Thank you for choosing us for your care. Given your symptoms, I would like you to do a lab-only visit to determine what is causing them.  I have placed the orders.  Please schedule an appointment with the lab right here in [place], or call [phone].  I will let you know when the results are back and next steps to take.

## 2024-04-09 ENCOUNTER — LAB (OUTPATIENT)
Dept: LAB | Facility: CLINIC | Age: 24
End: 2024-04-09
Payer: COMMERCIAL

## 2024-04-09 DIAGNOSIS — Z11.3 SCREEN FOR STD (SEXUALLY TRANSMITTED DISEASE): ICD-10-CM

## 2024-04-09 DIAGNOSIS — Z11.59 NEED FOR HEPATITIS C SCREENING TEST: ICD-10-CM

## 2024-04-09 LAB — T PALLIDUM AB SER QL: NONREACTIVE

## 2024-04-09 PROCEDURE — 87389 HIV-1 AG W/HIV-1&-2 AB AG IA: CPT

## 2024-04-09 PROCEDURE — 86780 TREPONEMA PALLIDUM: CPT

## 2024-04-09 PROCEDURE — 87491 CHLMYD TRACH DNA AMP PROBE: CPT

## 2024-04-09 PROCEDURE — 36415 COLL VENOUS BLD VENIPUNCTURE: CPT

## 2024-04-09 PROCEDURE — 86803 HEPATITIS C AB TEST: CPT

## 2024-04-09 PROCEDURE — 87591 N.GONORRHOEAE DNA AMP PROB: CPT

## 2024-04-10 LAB
C TRACH DNA SPEC QL PROBE+SIG AMP: NEGATIVE
HCV AB SERPL QL IA: NONREACTIVE
HIV 1+2 AB+HIV1 P24 AG SERPL QL IA: NONREACTIVE
N GONORRHOEA DNA SPEC QL NAA+PROBE: NEGATIVE

## 2024-05-01 ENCOUNTER — E-VISIT (OUTPATIENT)
Dept: URGENT CARE | Facility: CLINIC | Age: 24
End: 2024-05-01
Payer: COMMERCIAL

## 2024-05-01 DIAGNOSIS — N76.0 BACTERIAL VAGINOSIS: Primary | ICD-10-CM

## 2024-05-01 DIAGNOSIS — B96.89 BACTERIAL VAGINOSIS: Primary | ICD-10-CM

## 2024-05-01 PROCEDURE — 99421 OL DIG E/M SVC 5-10 MIN: CPT | Performed by: NURSE PRACTITIONER

## 2024-05-01 RX ORDER — METRONIDAZOLE 7.5 MG/G
1 GEL VAGINAL DAILY
Qty: 25 G | Refills: 0 | Status: SHIPPED | OUTPATIENT
Start: 2024-05-01 | End: 2024-05-06

## 2024-05-02 NOTE — PATIENT INSTRUCTIONS
"Thank you for choosing us for your care. I have placed an order for a prescription so that you can start treatment. View your full visit summary for details by clicking on the link below. Your pharmacist will able to address any questions you may have about the medication.     If you re not feeling better within 2-3 days, please schedule an appointment.  You can schedule an appointment right here in [facility], or call [phone]  If the visit is for the same symptoms as your eVisit, we ll refund the cost of your eVisit if seen within seven days.    Bacterial Vaginosis: Care Instructions  Overview     Bacterial vaginosis is a condition in which there is excess growth of certain bacteria that are normally found in the vagina. Symptoms often include abnormal gray or yellow discharge with a \"fishy\" odor. It is not considered an infection that is spread through sexual contact.  Symptoms can be annoying and uncomfortable. But bacterial vaginosis does not usually cause other health problems. However, in some cases it can lead to more serious issues.  While bacterial vaginosis may go away on its own, most doctors use antibiotics to treat it. You may have been prescribed pills or vaginal cream. With treatment, bacterial vaginosis usually clears up in 5 to 7 days.  Follow-up care is a key part of your treatment and safety. Be sure to make and go to all appointments, and call your doctor if you are having problems. It's also a good idea to know your test results and keep a list of the medicines you take.  How can you care for yourself at home?  Take your antibiotics as directed. Do not stop taking them just because you feel better. You need to take the full course of antibiotics.  Do not eat or drink anything that contains alcohol if you are taking metronidazole or tinidazole.  Keep using your medicine if you start your period. Use pads instead of tampons while using a vaginal cream or suppository. Tampons can absorb the " "medicine.  Wear loose cotton clothing. Do not wear nylon and other materials that hold body heat and moisture close to the skin.  Do not scratch. Relieve itching with a cold pack or a cool bath.  Do not wash your vulva more than once a day. Use plain water or a mild, unscented soap. Do not douche.  When should you call for help?   Call your doctor now or seek immediate medical care if:    You have a fever.     You have new or worse pain in your vagina or pelvis.   Watch closely for changes in your health, and be sure to contact your doctor if:    You have new or worse vaginal itching or discharge.     You have unexpected vaginal bleeding.     You are not getting better as expected.     Your symptoms return after you finish the course of your medicine.   Where can you learn more?  Go to https://www.[employer].net/patiented  Enter X360 in the search box to learn more about \"Bacterial Vaginosis: Care Instructions.\"  Current as of: November 27, 2023               Content Version: 14.0    9161-7388 [employer].   Care instructions adapted under license by your healthcare professional. If you have questions about a medical condition or this instruction, always ask your healthcare professional. [employer] disclaims any warranty or liability for your use of this information.      "

## 2024-06-17 ENCOUNTER — E-VISIT (OUTPATIENT)
Dept: URGENT CARE | Facility: CLINIC | Age: 24
End: 2024-06-17
Payer: COMMERCIAL

## 2024-06-17 DIAGNOSIS — Z11.3 SCREENING FOR STD (SEXUALLY TRANSMITTED DISEASE): Primary | ICD-10-CM

## 2024-06-17 PROCEDURE — 99421 OL DIG E/M SVC 5-10 MIN: CPT | Performed by: FAMILY MEDICINE

## 2024-06-17 NOTE — PATIENT INSTRUCTIONS
Thank you for choosing us for your care. Given your symptoms, I would like you to do a lab-only visit to determine what is causing them.  I have placed the orders.  Please schedule an appointment with the lab right here in [place], or call [phone].  I will let you know when the results are back and next steps to take.    Schedule a Lab Only appointment here.     Exposure to Sexually Transmitted Infections: Care Instructions  Overview  Sexually transmitted infections (STIs) are infections spread by sexual contact. This includes genital skin-to-skin contact and vaginal, oral, and anal sex. If you're pregnant, you can also spread them to your baby before or during the birth.  STIs are common. But they don't always cause symptoms. And if they are not treated, they can lead to health problems. Testing and treatment are important to help protect the health of you and your partner or partners.  STIs caused by bacteria can go away with treatment. STIs caused by viruses can be treated to relieve symptoms, but treatment won't make them go away.  Follow-up care is a key part of your treatment and safety. Be sure to make and go to all appointments, and call your doctor if you are having problems. It's also a good idea to know your test results and keep a list of the medicines you take.  How can you care for yourself at home?  Take medicines exactly as prescribed.  If your doctor prescribed antibiotics, take them as directed. Don't stop taking them just because you feel better. You need to take the full course of antibiotics.  Tell your sex partner or partners that they will need treatment. For certain STIs, your doctor may be able to prescribe treatment for any partners also.  Don't have sexual contact while you have symptoms of an STI or are being treated for an STI.  Don't douche. Douching changes the normal balance of bacteria in your vagina. It may increase the risk of spreading the infection to your uterus or other  "reproductive organs.  How can you prevent sexually transmitted infections (STIs)?  You can help prevent STIs if you wait to have sex with a new partner (or partners) until you've each been tested for STIs. It also helps if you use condoms and if you limit your sex partners to one person who has sex only with you.  When should you call for help?   Call 911 anytime you think you may need emergency care. For example, call if:    You have sudden, severe pain in your belly or pelvis.   Call your doctor now or seek immediate medical care if:    You have new belly or pelvic pain.     You have a fever.     You have new or increased burning or pain with urination, or you cannot urinate.     You have pain, swelling, or tenderness in the scrotum.     You are pregnant and have any symptoms of an STI.   Watch closely for changes in your health, and be sure to contact your doctor if:    You have unusual vaginal bleeding.     You have a discharge from the vagina, penis, or anus.     You have any new symptoms, such as sores, bumps, rashes, blisters, or warts in the genital or anal area.     You have itching, tingling, pain, or burning in the genital or anal area.     You think you may have been exposed to an STI.     You have a sore throat or sores in your mouth or on your tongue.   Where can you learn more?  Go to https://www.[employer].net/patiented  Enter M049 in the search box to learn more about \"Exposure to Sexually Transmitted Infections: Care Instructions.\"  Current as of: November 27, 2023               Content Version: 14.0    7714-7324 [employer].   Care instructions adapted under license by your healthcare professional. If you have questions about a medical condition or this instruction, always ask your healthcare professional. [employer] disclaims any warranty or liability for your use of this information.      "

## 2024-06-18 ENCOUNTER — LAB (OUTPATIENT)
Dept: LAB | Facility: CLINIC | Age: 24
End: 2024-06-18
Payer: COMMERCIAL

## 2024-06-18 DIAGNOSIS — Z11.3 SCREENING FOR STD (SEXUALLY TRANSMITTED DISEASE): ICD-10-CM

## 2024-06-18 LAB
CLUE CELLS: ABNORMAL
T PALLIDUM AB SER QL: NONREACTIVE
TRICHOMONAS, WET PREP: ABNORMAL
WBC'S/HIGH POWER FIELD, WET PREP: ABNORMAL
YEAST, WET PREP: ABNORMAL

## 2024-06-18 PROCEDURE — 87210 SMEAR WET MOUNT SALINE/INK: CPT

## 2024-06-18 PROCEDURE — 87491 CHLMYD TRACH DNA AMP PROBE: CPT

## 2024-06-18 PROCEDURE — 87389 HIV-1 AG W/HIV-1&-2 AB AG IA: CPT | Performed by: FAMILY MEDICINE

## 2024-06-18 PROCEDURE — 87591 N.GONORRHOEAE DNA AMP PROB: CPT

## 2024-06-18 PROCEDURE — 86780 TREPONEMA PALLIDUM: CPT | Performed by: FAMILY MEDICINE

## 2024-06-18 PROCEDURE — 86803 HEPATITIS C AB TEST: CPT | Performed by: FAMILY MEDICINE

## 2024-06-18 PROCEDURE — 36415 COLL VENOUS BLD VENIPUNCTURE: CPT | Performed by: FAMILY MEDICINE

## 2024-08-23 ENCOUNTER — VIRTUAL VISIT (OUTPATIENT)
Dept: INTERNAL MEDICINE | Facility: CLINIC | Age: 24
End: 2024-08-23
Payer: COMMERCIAL

## 2024-08-23 DIAGNOSIS — F33.42 RECURRENT MAJOR DEPRESSIVE DISORDER, IN FULL REMISSION (H): ICD-10-CM

## 2024-08-23 DIAGNOSIS — F41.1 GAD (GENERALIZED ANXIETY DISORDER): Primary | ICD-10-CM

## 2024-08-23 PROCEDURE — 99214 OFFICE O/P EST MOD 30 MIN: CPT | Mod: 95

## 2024-08-23 PROCEDURE — G2211 COMPLEX E/M VISIT ADD ON: HCPCS | Mod: 95

## 2024-08-23 RX ORDER — ESCITALOPRAM OXALATE 10 MG/1
TABLET ORAL
Qty: 31 TABLET | Refills: 0 | Status: SHIPPED | OUTPATIENT
Start: 2024-08-23 | End: 2024-09-26

## 2024-08-23 ASSESSMENT — ANXIETY QUESTIONNAIRES
7. FEELING AFRAID AS IF SOMETHING AWFUL MIGHT HAPPEN: NOT AT ALL
IF YOU CHECKED OFF ANY PROBLEMS ON THIS QUESTIONNAIRE, HOW DIFFICULT HAVE THESE PROBLEMS MADE IT FOR YOU TO DO YOUR WORK, TAKE CARE OF THINGS AT HOME, OR GET ALONG WITH OTHER PEOPLE: NOT DIFFICULT AT ALL
GAD7 TOTAL SCORE: 1
GAD7 TOTAL SCORE: 1
4. TROUBLE RELAXING: NOT AT ALL
1. FEELING NERVOUS, ANXIOUS, OR ON EDGE: NOT AT ALL
8. IF YOU CHECKED OFF ANY PROBLEMS, HOW DIFFICULT HAVE THESE MADE IT FOR YOU TO DO YOUR WORK, TAKE CARE OF THINGS AT HOME, OR GET ALONG WITH OTHER PEOPLE?: NOT DIFFICULT AT ALL
5. BEING SO RESTLESS THAT IT IS HARD TO SIT STILL: NOT AT ALL
GAD7 TOTAL SCORE: 1
3. WORRYING TOO MUCH ABOUT DIFFERENT THINGS: NOT AT ALL
6. BECOMING EASILY ANNOYED OR IRRITABLE: NOT AT ALL
7. FEELING AFRAID AS IF SOMETHING AWFUL MIGHT HAPPEN: NOT AT ALL
2. NOT BEING ABLE TO STOP OR CONTROL WORRYING: SEVERAL DAYS

## 2024-08-23 ASSESSMENT — PATIENT HEALTH QUESTIONNAIRE - PHQ9
SUM OF ALL RESPONSES TO PHQ QUESTIONS 1-9: 0
SUM OF ALL RESPONSES TO PHQ QUESTIONS 1-9: 0
10. IF YOU CHECKED OFF ANY PROBLEMS, HOW DIFFICULT HAVE THESE PROBLEMS MADE IT FOR YOU TO DO YOUR WORK, TAKE CARE OF THINGS AT HOME, OR GET ALONG WITH OTHER PEOPLE: NOT DIFFICULT AT ALL

## 2024-08-23 NOTE — PROGRESS NOTES
Lyly is a 24 year old who is being evaluated via a billable video visit.    How would you like to obtain your AVS? MyChart  If the video visit is dropped, the invitation should be resent by: Text to cell phone: [phone]  Will anyone else be joining your video visit? No      Assessment & Plan     (F41.1) BALAJI (generalized anxiety disorder)  (primary encounter diagnosis)  Comment: she has been on Lexapro for 10+ years for her underlying anxiety and depression. She feels these are both under excellent control and would like to try tapering off of this.  She does have history of suicide attempt in 2019 when she tried to take 15 pills of her Lexapro. Lyly feels her mental health is under excellent control as of lately.  We will try tapering off of the Lexapro over the next 5-6 weeks  Plan: escitalopram (LEXAPRO) 10 MG tablet            (F33.42) Recurrent major depressive disorder, in full remission (H24)  Comment: see details above   Plan:     The longitudinal plan of care for the diagnosis(es)/condition(s) as documented were addressed during this visit. Due to the added complexity in care, I will continue to support Lyly in the subsequent management and with ongoing continuity of care.        Subjective   Lyly is a 24 year old, presenting for the following health issues:  MH Follow Up        8/23/2024     9:10 AM   Additional Questions   Roomed by Ivy PENA           Objective           Vitals:  No vitals were obtained today due to virtual visit.    Physical Exam   GENERAL: alert and no distress  PSYCH: Appropriate affect, tone, and pace of words          Video-Visit Details    Type of service:  Video Visit   Originating Location (pt. Location): Home    Distant Location (provider location):  Off-site  Platform used for Video Visit: Gabi  Signed Electronically by: DAVION Armando CNP

## 2024-09-11 ENCOUNTER — E-VISIT (OUTPATIENT)
Dept: URGENT CARE | Facility: CLINIC | Age: 24
End: 2024-09-11
Payer: COMMERCIAL

## 2024-09-11 DIAGNOSIS — B96.89 BV (BACTERIAL VAGINOSIS): ICD-10-CM

## 2024-09-11 DIAGNOSIS — N76.0 BV (BACTERIAL VAGINOSIS): ICD-10-CM

## 2024-09-11 DIAGNOSIS — Z11.3 SCREENING EXAMINATION FOR STI: ICD-10-CM

## 2024-09-11 DIAGNOSIS — N89.8 VAGINAL DISCHARGE: Primary | ICD-10-CM

## 2024-09-11 PROCEDURE — 99421 OL DIG E/M SVC 5-10 MIN: CPT | Performed by: NURSE PRACTITIONER

## 2024-09-11 NOTE — PATIENT INSTRUCTIONS
Thank you for choosing us for your care. Given your symptoms, I would like you to do a lab-only visit to determine what is causing them.  I have placed the orders.  Please schedule an appointment with the lab right here in [place], or call [phone].  I will let you know when the results are back and next steps to take.    Schedule a Lab Only appointment here.

## 2024-09-11 NOTE — TELEPHONE ENCOUNTER
Patient calling ([phone]).  States she would like the blood tests ordered as well for the STI testing; reports she wants syphilis included.    Please advise, thanks.

## 2024-09-13 ENCOUNTER — LAB (OUTPATIENT)
Dept: LAB | Facility: CLINIC | Age: 24
End: 2024-09-13
Payer: COMMERCIAL

## 2024-09-13 DIAGNOSIS — N76.0 BV (BACTERIAL VAGINOSIS): Primary | ICD-10-CM

## 2024-09-13 DIAGNOSIS — B96.89 BV (BACTERIAL VAGINOSIS): Primary | ICD-10-CM

## 2024-09-13 DIAGNOSIS — Z11.3 SCREENING EXAMINATION FOR STI: ICD-10-CM

## 2024-09-13 DIAGNOSIS — N89.8 VAGINAL DISCHARGE: ICD-10-CM

## 2024-09-13 LAB
CLUE CELLS: PRESENT
HCV AB SERPL QL IA: NONREACTIVE
HIV 1+2 AB+HIV1 P24 AG SERPL QL IA: NONREACTIVE
T PALLIDUM AB SER QL: NONREACTIVE
TRICHOMONAS, WET PREP: ABNORMAL
WBC'S/HIGH POWER FIELD, WET PREP: ABNORMAL
YEAST, WET PREP: ABNORMAL

## 2024-09-13 PROCEDURE — 87491 CHLMYD TRACH DNA AMP PROBE: CPT

## 2024-09-13 PROCEDURE — 86780 TREPONEMA PALLIDUM: CPT

## 2024-09-13 PROCEDURE — 87389 HIV-1 AG W/HIV-1&-2 AB AG IA: CPT

## 2024-09-13 PROCEDURE — 87210 SMEAR WET MOUNT SALINE/INK: CPT

## 2024-09-13 PROCEDURE — 86803 HEPATITIS C AB TEST: CPT

## 2024-09-13 PROCEDURE — 36415 COLL VENOUS BLD VENIPUNCTURE: CPT

## 2024-09-13 PROCEDURE — 87591 N.GONORRHOEAE DNA AMP PROB: CPT

## 2024-09-13 RX ORDER — METRONIDAZOLE 500 MG/1
500 TABLET ORAL 2 TIMES DAILY
Qty: 14 TABLET | Refills: 0 | Status: SHIPPED | OUTPATIENT
Start: 2024-09-13 | End: 2024-09-20

## 2024-09-14 LAB
C TRACH DNA SPEC QL NAA+PROBE: NEGATIVE
N GONORRHOEA DNA SPEC QL NAA+PROBE: NEGATIVE

## 2024-09-15 RX ORDER — METRONIDAZOLE 7.5 MG/G
1 GEL VAGINAL DAILY
Qty: 35 G | Refills: 0 | Status: SHIPPED | OUTPATIENT
Start: 2024-09-15 | End: 2024-09-22

## 2024-11-05 ENCOUNTER — E-VISIT (OUTPATIENT)
Dept: URGENT CARE | Facility: CLINIC | Age: 24
End: 2024-11-05
Payer: COMMERCIAL

## 2024-11-05 ENCOUNTER — LAB (OUTPATIENT)
Dept: LAB | Facility: CLINIC | Age: 24
End: 2024-11-05
Payer: COMMERCIAL

## 2024-11-05 DIAGNOSIS — J02.9 SORE THROAT: Primary | ICD-10-CM

## 2024-11-05 DIAGNOSIS — J02.9 SORE THROAT: ICD-10-CM

## 2024-11-05 LAB
DEPRECATED S PYO AG THROAT QL EIA: NEGATIVE
GROUP A STREP BY PCR: NOT DETECTED

## 2024-11-05 PROCEDURE — 87651 STREP A DNA AMP PROBE: CPT

## 2024-11-05 PROCEDURE — 99421 OL DIG E/M SVC 5-10 MIN: CPT | Performed by: PREVENTIVE MEDICINE

## 2024-11-05 NOTE — PATIENT INSTRUCTIONS
Dear Lyly,    After reviewing your responses, I would like you to come in for a swab to make sure we treat you correctly. This swab is to evaluate you for possible Strep Throat, and should be scheduled for today or tomorrow. Please use the Schedule Now button in [employer] to schedule your swab. Otherwise, click this link to schedule a lab only appointment.    Lab appointments are not available at most locations on the weekends. If no Lab Only appointment is available, you should be seen in any of our convenient Urgent Care Centers for an in person visit, which can be found on our website here.    You will receive instructions with your results in [employer] once they are available.     If your symptoms worsen, you develop difficulty breathing, difficulty with drinking enough to stay hydrated, difficulty swallowing your saliva or have fevers for more than 5 days, please contact your primary care provider for an appointment or visit an Urgent Care Center to be seen.      Thanks again for choosing us as your health care partner.   Emanuel Mtz MD, MD  Sore Throat: Care Instructions  Overview     Infection by bacteria or a virus causes most sore throats. Cigarette smoke, dry air, air pollution, allergies, and yelling can also cause a sore throat. Sore throats can be painful and annoying. Fortunately, most sore throats go away on their own. If you have a bacterial infection, your doctor may prescribe antibiotics.  Follow-up care is a key part of your treatment and safety. Be sure to make and go to all appointments, and call your doctor if you are having problems. It's also a good idea to know your test results and keep a list of the medicines you take.  How can you care for yourself at home?  If your doctor prescribed antibiotics, take them as directed. Do not stop taking them just because you feel better. You need to take the full course of antibiotics.  Gargle with warm salt water several times a day to  "help reduce swelling and relieve pain. Mix 1/2 teaspoon of salt in 1 cup of warm water.  Take an over-the-counter pain medicine, such as acetaminophen (Tylenol), ibuprofen (Advil, Motrin), or naproxen (Aleve). Read and follow all instructions on the label.  Be careful when taking over-the-counter cold or flu medicines and Tylenol at the same time. Many of these medicines have acetaminophen, which is Tylenol. Read the labels to make sure that you are not taking more than the recommended dose. Too much acetaminophen (Tylenol) can be harmful.  Drink plenty of fluids. Fluids may help soothe an irritated throat. Hot fluids, such as tea or soup, may help decrease throat pain.  Use over-the-counter throat lozenges to soothe pain. Regular cough drops or hard candy may also help. These should not be given to young children because of the risk of choking.  Do not smoke or allow others to smoke around you. If you need help quitting, talk to your doctor about stop-smoking programs and medicines. These can increase your chances of quitting for good.  Use a vaporizer or humidifier to add moisture to your bedroom. Follow the directions for cleaning the machine.  When should you call for help?   Call your doctor now or seek immediate medical care if:    You have trouble breathing.     Your sore throat gets much worse on one side.     You have new or worse trouble swallowing.     You have a new or higher fever.   Watch closely for changes in your health, and be sure to contact your doctor if you do not get better as expected.  Where can you learn more?  Go to https://www.[employer].net/patiented  Enter U420 in the search box to learn more about \"Sore Throat: Care Instructions.\"  Current as of: September 27, 2023  Content Version: 14.2 2024 [facility].   Care instructions adapted under license by your healthcare professional. If you have questions about a medical condition or this instruction, always ask your " healthcare professional. [facility] disclaims any warranty or liability for your use of this information.

## 2024-12-17 ENCOUNTER — PATIENT OUTREACH (OUTPATIENT)
Dept: MIDWIFE SERVICES | Facility: CLINIC | Age: 24
End: 2024-12-17
Payer: COMMERCIAL

## 2025-02-16 ENCOUNTER — HEALTH MAINTENANCE LETTER (OUTPATIENT)
Age: 25
End: 2025-02-16

## 2025-06-09 ENCOUNTER — PATIENT OUTREACH (OUTPATIENT)
Dept: CARE COORDINATION | Facility: CLINIC | Age: 25
End: 2025-06-09
Payer: COMMERCIAL

## 2025-06-11 ENCOUNTER — PATIENT OUTREACH (OUTPATIENT)
Dept: CARE COORDINATION | Facility: CLINIC | Age: 25
End: 2025-06-11
Payer: COMMERCIAL

## 2025-07-03 ENCOUNTER — E-VISIT (OUTPATIENT)
Dept: URGENT CARE | Facility: CLINIC | Age: 25
End: 2025-07-03
Payer: COMMERCIAL

## 2025-07-03 DIAGNOSIS — N30.00 ACUTE CYSTITIS WITHOUT HEMATURIA: Primary | ICD-10-CM

## 2025-07-03 RX ORDER — NITROFURANTOIN 25; 75 MG/1; MG/1
100 CAPSULE ORAL 2 TIMES DAILY
Qty: 10 CAPSULE | Refills: 0 | Status: SHIPPED | OUTPATIENT
Start: 2025-07-03 | End: 2025-07-08

## 2025-07-03 NOTE — PATIENT INSTRUCTIONS
Dear Lyly Ingram    After reviewing your responses, I've been able to diagnose you with a urinary tract infection, which is a common infection of the bladder with bacteria.  This is not a sexually transmitted infection, though urinating immediately after intercourse can help prevent infections.  Drinking lots of fluids is also helpful to clear your current infection and prevent the next one.      I have sent a prescription for antibiotics to your pharmacy to treat this infection.    It is important that you take all of your prescribed medication even if your symptoms are improving after a few doses.  Taking all of your medicine helps prevent the symptoms from returning.     If your symptoms worsen, you develop pain in your back or stomach, develop fevers, or are not improving in 5 days, please contact your primary care provider for an appointment or visit any of our convenient Walk-in or Urgent Care Centers to be seen, which can be found on our website here.    Thanks again for choosing us as your health care partner,    Deborah Rocha MD  Female Urinary Tract Infection (UTI): Care Instructions  Overview    A urinary tract infection (UTI) is an infection caused by bacteria. It can happen anywhere in the urinary tract. A UTI can happen in the:  Kidneys.  Ureters, the tubes that connect the kidneys to the bladder.  Bladder.  Urethra, where the urine comes out.  Most UTIs are bladder infections. They often cause pain or burning when you urinate.  Most UTIs can be cured with antibiotics. If you are prescribed antibiotics, be sure to complete your treatment so that the infection does not get worse.  Follow-up care is a key part of your treatment and safety. Be sure to make and go to all appointments, and call your doctor if you are having problems. It's also a good idea to know your test results and keep a list of the medicines you take.  How can you care for yourself at home?  Take your antibiotics as  directed. Do not stop taking them just because you feel better. You need to take the full course of antibiotics.  Drink extra water and other fluids for the next day or two. This will help make the urine less concentrated and help wash out the bacteria that are causing the infection. (If you have kidney, heart, or liver disease and have to limit fluids, talk with your doctor before you increase the amount of fluids you drink.)  Avoid drinks that are carbonated or have caffeine. They can irritate the bladder.  Urinate often. Try to empty your bladder each time.  To relieve pain, try taking a warm bath in plain water or laying a heating pad set on low over your lower belly or genital area. Never go to sleep with a heating pad in place.  Avoid bubble baths and hygiene sprays, powders, or perfumes in the vagina or on the vulva. Do not douche. These things can irritate the urethra and may make symptoms worse.  To help prevent UTIs  Drink plenty of water each day. This helps you urinate often, which clears bacteria from your system. (If you have kidney, heart, or liver disease and have to limit fluids, talk with your doctor before you increase the amount of fluids you drink.)  Urinate when you need to.  If you are sexually active, urinate right after you have sex.  Change sanitary pads often.  After going to the bathroom, wipe from front to back.  When should you call for help?  Contact your doctor now or seek immediate medical care if:  Symptoms such as a fever, chills, nausea, or vomiting get worse or happen for the first time.  You have new pain in your back just below your rib cage. This is called flank pain.  There is new blood or pus in your urine.  You are not able to take or keep down your antibiotics.  Watch closely for changes in your health, and be sure to contact your doctor if:  You are not getting better after taking an antibiotic for 2 days.  Your symptoms go away but then come back.  Where can you learn  "more?  Go to https://www.[employer].net/patiented  Enter K848 in the search box to learn more about \"Female Urinary Tract Infection (UTI): Care Instructions.\"  Current as of: April 30, 2024  Content Version: 14.5 2024-2025 [employer].   Care instructions adapted under license by your healthcare professional. If you have questions about a medical condition or this instruction, always ask your healthcare professional. [employer] disclaims any warranty or liability for your use of this information.    "